# Patient Record
Sex: MALE | Race: WHITE
[De-identification: names, ages, dates, MRNs, and addresses within clinical notes are randomized per-mention and may not be internally consistent; named-entity substitution may affect disease eponyms.]

---

## 2018-02-26 ENCOUNTER — HOSPITAL ENCOUNTER (OUTPATIENT)
Dept: HOSPITAL 45 - C.ULTR | Age: 38
Discharge: HOME | End: 2018-02-26
Attending: INTERNAL MEDICINE
Payer: COMMERCIAL

## 2018-02-26 DIAGNOSIS — R10.30: ICD-10-CM

## 2018-02-26 DIAGNOSIS — I86.1: Primary | ICD-10-CM

## 2018-02-26 DIAGNOSIS — N45.1: ICD-10-CM

## 2018-02-26 NOTE — DIAGNOSTIC IMAGING REPORT
(TESTICULAR) SCROTUM-CONT



CLINICAL HISTORY: 37 years-old Male presenting with R10.31. 



TECHNIQUE: Real-time grayscale and color and spectral Doppler ultrasound imaging

of the scrotum was performed.



COMPARISON: None.



FINDINGS:

Right testis: Normal echogenicity and echotexture. Testis measures 5.0 x 2.5 x

2.0 cm. Normal color Doppler flow and arterial and venous waveforms in the

testicular parenchyma. 2 mm epididymal head cyst. No hydrocele. No varicocele. 



Left testis: Normal echogenicity and echotexture. Testis measures 5.0 x 2.4 x

2.0 cm. Normal color Doppler flow and arterial and venous waveforms in the

testicular parenchyma. Epididymal head normal. No hydrocele. Varicocele present.





Symmetric perfusion of the testes.



IMPRESSION:

1.  No evidence of testicular torsion.



2.  Left varicocele.







Electronically signed by:  Wilian Norman M.D.

2/26/2018 8:27 AM



Dictated Date/Time:  2/26/2018 8:26 AM

## 2018-05-03 ENCOUNTER — HOSPITAL ENCOUNTER (OUTPATIENT)
Dept: HOSPITAL 45 - C.NEUR | Age: 38
Discharge: HOME | End: 2018-05-03
Attending: PSYCHIATRY & NEUROLOGY
Payer: COMMERCIAL

## 2018-05-03 DIAGNOSIS — R42: Primary | ICD-10-CM

## 2018-05-03 NOTE — EEG PROCEDURE NOTE
EEG Procedure Note


Date of Service


May 3, 2018.





Start / End Times


Start Time:  2:47 PM


End Time:  3:08 PM





Referring Physician


Sajan Navarrete





History


This is a 37-year-old male with spells of dizziness.  EEG for further 

evaluation of possible seizure etiology.





Home Medication List


Scheduled


Albuterol (Proair Hfa), 1-2 PUFFS PO q4-6h





Miscellaneous Medications


Diclofenac (Voltaren), 75 MG PO


Diclofenac Sodium (Topical) (Voltaren 1% Top Gel)





Description


This is a 21 electrode EEG with a single channel dedicated to limited EKG. The 

electrodes were placed in accordance with the International 10-20 system.





At the start of the recording the patient was in an awake state. Background was 

well organized and composed of symmetric mixed alpha and beta frequencies. 

There was a symmetric well-formed moderate amplitude 11Hz posterior dominant 

rhythm that was reactive to eye opening and closure.  Hyperventilation was not 

done. Intermittent photic stimulation at various frequencies produced no 

abnormalities. Sleep was indicated by vertex waves and symmetric sleep spindles.





Interpretation


This is a normal awake and asleep routine EEG. 





There was no electrographic seizures or epileptiform discharges.





Clinical Correlation


A normal EEG does not rule out epilepsy if there is a strong clinical suspicion.